# Patient Record
(demographics unavailable — no encounter records)

---

## 2024-11-01 NOTE — REVIEW OF SYSTEMS
[Change in Activity] : change in activity [Joint Pains] : arthralgias [Appropriate Age Development] : development appropriate for age [Rash] : no rash [Heart Problems] : no heart problems [Congestion] : no congestion [Feeding Problem] : no feeding problem

## 2024-11-01 NOTE — DATA REVIEWED
[de-identified] : 3 views of the right knee in the office today reveals healed proximal tibia salter II fracture, Alignment is acceptable for age, The physis appears to be closed symmetrically at this time.

## 2024-11-01 NOTE — REASON FOR VISIT
[Follow Up] : a follow up visit [Patient] : patient [Family Member] : family member [FreeTextEntry1] : right proximal tibia fracture, sustained 7/31/24

## 2024-11-01 NOTE — END OF VISIT
[FreeTextEntry3] : A physician assistant/resident assisted with documenting the visit and acted as a scribe. I have seen and examined the patient, made my assessment and plan and have made all modifications necessary to the note.  Isha Jauregui MD Pediatric Orthopaedics Surgery [facility]

## 2024-11-01 NOTE — PHYSICAL EXAM
[FreeTextEntry1] : General: healthy appearing, acting appropriate for age.  HEENT: NCAT, Normal conjunctiva Cardio: Appears well perfused, no peripheral edema, brisk cap refill.  Lungs: no obvious increased WOB, no audible wheeze heard without use of stethoscope.  Abdomen: not examined.  Skin: No visible rashes on exposed skin  RLE:   no swelling, no clinical deformity no ttp over the proximal tibia  ROM of knee improving - about 0-140  mild quad atrophy noted.  SILT, NVI, Moving digits freely WWP distally, brisk cap refill, 2+ RP  Ambulating with a limp.

## 2024-11-01 NOTE — END OF VISIT
[FreeTextEntry3] : A physician assistant/resident assisted with documenting the visit and acted as a scribe. I have seen and examined the patient, made my assessment and plan and have made all modifications necessary to the note.  Isha Jauregui MD Pediatric Orthopaedics Surgery [facility]

## 2024-11-01 NOTE — HISTORY OF PRESENT ILLNESS
[0] : currently ~his/her~ pain is 0 out of 10 [FreeTextEntry1] : 15 yo male presents with family member for f/u of right proximal tibia fracture. He states on 7/31/24 he was playing soccer and describes kicking with the left foot with planted right foot and he felt a Pop in the right knee. He was seen at [facility] where he was found to have a salter II of the proximal tibia and placed in a LLC.  At visit on 8/28, cast was removed, and he was allowed to start WBAT with no further immobilization.  He has been undergoing PT. He is without pain at this time. He states he still feels weak and not ready to return to sport.

## 2024-11-01 NOTE — ASSESSMENT
[FreeTextEntry1] : Tristin is a 15 yo M with a SH II fracture of right proximal tibia  The history for today's visit was obtained from the child, as well as the family member. The child's history was unreliable alone due to age and therefore, the family member was used today as an independent historian. 3 views of the right knee in the office today reveals healed proximal tibia salter II fracture, Alignment is acceptable for age, The physis appears to be closed symmetrically at this time.  He is doing well. Fracture is healed at this time. He still has a slight limp and some atrophy of the quad noted. He will continue PT and home exercises working on strength. NO gym or sports until reevaluation. He can start doing some light activity and advance as tolerated. He will f/u in 6 weeks for strength check and see if full clearance can be given No xrays needed unless clinical concerns Note for school provided, no gym and extra time given All questions answered. Parent in agreement with the plan. IKerri, MPAS, PAC, have acted as a scribe and documented the above for Dr. Jauregui.

## 2024-11-01 NOTE — HISTORY OF PRESENT ILLNESS
[0] : currently ~his/her~ pain is 0 out of 10 [FreeTextEntry1] : 15 yo male presents with family member for f/u of right proximal tibia fracture. He states on 7/31/24 he was playing soccer and describes kicking with the left foot with planted right foot and he felt a Pop in the right knee. He was seen at [facility] where he was found to have a salter II of the proximal tibia and placed in a LLC.  At visit on 8/28, cast was removed, and he was allowed to start WBAT with no further immobilization.  He has been undergoing PT. He is without pain at this time. He states he still feels weak and not ready to return to sport.

## 2024-11-01 NOTE — DATA REVIEWED
[de-identified] : 3 views of the right knee in the office today reveals healed proximal tibia salter II fracture, Alignment is acceptable for age, The physis appears to be closed symmetrically at this time.

## 2025-01-23 NOTE — PHYSICAL EXAM
[FreeTextEntry1] : Healthy appearing 15 year-old child. Awake, alert, in no acute distress. Pleasant and cooperative.  Eyes are clear with no sclera abnormalities. External ears, nose and mouth are clear.  Good respiratory effort with no audible wheezing without use of a stethoscope. Ambulates independently with no evidence of antalgia. Good coordination and balance. Able to get on and off exam table without difficulty.   RLE:   Skin is clean, dry and intact. There is no erythema, swelling or ecchymosis. No effusion present. No ttp over the proximal tibia  Joint is stable to varus and valgus stresses. Full ROM of the knee with 5/5 strength Sensation is grossly intact. DP 2+, Brisk Capillary refill in all digits.   He is able to jump and hop on both lower extremities.

## 2025-01-23 NOTE — HISTORY OF PRESENT ILLNESS
[0] : currently ~his/her~ pain is 0 out of 10 [FreeTextEntry1] : 15 yo male presents with family member for f/u of right proximal tibia fracture. He states on 7/31/24, ~6 months ago, he was playing soccer and describes kicking with the left foot with planted right foot and he felt a pop in the right knee. He was seen at [facility] where he was found to have a salter II of the proximal tibia and placed in a LLC.  At visit on 8/28, cast was removed, and he was allowed to start WBAT with no further immobilization. He was last seen on 10/30/24. He has been undergoing PT 3x/week and wants to continue with more therapy as he advances back to activity.   He is without pain at this time.  Here today for continued care.

## 2025-01-23 NOTE — ASSESSMENT
[FreeTextEntry1] : Tristin is a 15 yo M with a SH II fracture of right proximal tibia, DOI 7/31/24 - 6 months out  The history for today's visit was obtained from the child, as well as the family member. The child's history was unreliable alone due to age and therefore, the family member was used today as an independent historian.  Overall, Tristin is doing well. He has regained good strength and ROM to the LE and is ready to return to activity. He will continue PT and home exercises working on strength. A new prescription was provided today. He may return to activities as tolerated at this time. School note was provided.   I am happy to see TRISTIN if there are any concerns or anytime a problem arises in the future.   All questions were answered, the family expresses understanding and agrees with the plan of care.

## 2025-01-23 NOTE — HISTORY OF PRESENT ILLNESS
[0] : currently ~his/her~ pain is 0 out of 10 [FreeTextEntry1] : 15 yo male presents with family member for f/u of right proximal tibia fracture. He states on 7/31/24, ~6 months ago, he was playing soccer and describes kicking with the left foot with planted right foot and he felt a pop in the right knee. He was seen at [facility] where he was found to have a salter II of the proximal tibia and placed in a LLC.  At visit on 8/28, cast was removed, and he was allowed to start WBAT with no further immobilization. He was last seen on 10/30/24. He has been undergoing PT 3x/week and wants to continue with more therapy as he advances back to activity.   He is without pain at this time.  Here today for continued care.

## 2025-01-23 NOTE — DATA REVIEWED
[de-identified] : No new images today.  3 views of the right knee in the office 10/30/2024 reveals healed proximal tibia salter II fracture, Alignment is acceptable for age, The physis appears to be closed symmetrically at this time.

## 2025-01-23 NOTE — DATA REVIEWED
[de-identified] : No new images today.  3 views of the right knee in the office 10/30/2024 reveals healed proximal tibia salter II fracture, Alignment is acceptable for age, The physis appears to be closed symmetrically at this time.